# Patient Record
Sex: FEMALE | Race: BLACK OR AFRICAN AMERICAN | Employment: FULL TIME | URBAN - METROPOLITAN AREA
[De-identification: names, ages, dates, MRNs, and addresses within clinical notes are randomized per-mention and may not be internally consistent; named-entity substitution may affect disease eponyms.]

---

## 2020-10-01 ENCOUNTER — TRANSCRIBE ORDER (OUTPATIENT)
Dept: REGISTRATION | Age: 45
End: 2020-10-01

## 2020-10-01 ENCOUNTER — HOSPITAL ENCOUNTER (OUTPATIENT)
Dept: PREADMISSION TESTING | Age: 45
Discharge: HOME | End: 2020-10-01
Payer: MEDICAID

## 2020-10-01 DIAGNOSIS — Z01.812 PRE-PROCEDURE LAB EXAM: ICD-10-CM

## 2020-10-01 DIAGNOSIS — Z01.812 PRE-PROCEDURE LAB EXAM: Primary | ICD-10-CM

## 2020-10-01 PROCEDURE — 87635 SARS-COV-2 COVID-19 AMP PRB: CPT

## 2020-10-02 LAB — SARS-COV-2, COV2NT: NOT DETECTED

## 2020-10-05 ENCOUNTER — ANESTHESIA EVENT (OUTPATIENT)
Dept: SURGERY | Age: 45
End: 2020-10-05
Payer: MEDICAID

## 2020-10-05 RX ORDER — LORATADINE 10 MG
10 TABLET,DISINTEGRATING ORAL DAILY
COMMUNITY
Start: 2020-09-21

## 2020-10-05 RX ORDER — LISINOPRIL 20 MG/1
TABLET ORAL
COMMUNITY
Start: 2020-09-21

## 2020-10-05 RX ORDER — IPRATROPIUM BROMIDE AND ALBUTEROL SULFATE 2.5; .5 MG/3ML; MG/3ML
3 SOLUTION RESPIRATORY (INHALATION)
COMMUNITY
Start: 2020-09-21 | End: 2021-09-21

## 2020-10-05 RX ORDER — ROSUVASTATIN CALCIUM 10 MG/1
10 TABLET, COATED ORAL DAILY
COMMUNITY
Start: 2020-09-29 | End: 2021-03-28

## 2020-10-05 RX ORDER — FUROSEMIDE 20 MG/1
20 TABLET ORAL DAILY
COMMUNITY
Start: 2020-09-21 | End: 2021-09-21

## 2020-10-05 RX ORDER — ALBUTEROL SULFATE 90 UG/1
2 AEROSOL, METERED RESPIRATORY (INHALATION)
COMMUNITY
Start: 2020-09-21

## 2020-10-05 RX ORDER — GLYBURIDE-METFORMIN HYDROCHLORIDE 5; 500 MG/1; MG/1
1 TABLET ORAL
COMMUNITY
Start: 2020-09-21 | End: 2021-09-21

## 2020-10-06 ENCOUNTER — HOSPITAL ENCOUNTER (OUTPATIENT)
Age: 45
Setting detail: OUTPATIENT SURGERY
Discharge: HOME | End: 2020-10-06
Attending: DENTIST | Admitting: DENTIST
Payer: MEDICAID

## 2020-10-06 ENCOUNTER — ANESTHESIA (OUTPATIENT)
Dept: SURGERY | Age: 45
End: 2020-10-06
Payer: MEDICAID

## 2020-10-06 VITALS
TEMPERATURE: 97.5 F | HEART RATE: 83 BPM | RESPIRATION RATE: 20 BRPM | OXYGEN SATURATION: 92 % | BODY MASS INDEX: 41.99 KG/M2 | HEIGHT: 63 IN | WEIGHT: 237 LBS | DIASTOLIC BLOOD PRESSURE: 109 MMHG | SYSTOLIC BLOOD PRESSURE: 183 MMHG

## 2020-10-06 DIAGNOSIS — K02.9 CARIES INVOLVING MULTIPLE SURFACES OF TOOTH: Primary | ICD-10-CM

## 2020-10-06 LAB
GLUCOSE BLD STRIP.AUTO-MCNC: 185 MG/DL (ref 70–110)
GLUCOSE BLD STRIP.AUTO-MCNC: 216 MG/DL (ref 70–110)

## 2020-10-06 PROCEDURE — 2709999900 HC NON-CHARGEABLE SUPPLY: Performed by: DENTIST

## 2020-10-06 PROCEDURE — 76210000020 HC REC RM PH II FIRST 0.5 HR: Performed by: DENTIST

## 2020-10-06 PROCEDURE — 74011250636 HC RX REV CODE- 250/636

## 2020-10-06 PROCEDURE — 74011250636 HC RX REV CODE- 250/636: Performed by: ANESTHESIOLOGY

## 2020-10-06 PROCEDURE — 82962 GLUCOSE BLOOD TEST: CPT

## 2020-10-06 PROCEDURE — 77030040922 HC BLNKT HYPOTHRM STRY -A: Performed by: DENTIST

## 2020-10-06 PROCEDURE — 74011250636 HC RX REV CODE- 250/636: Performed by: NURSE ANESTHETIST, CERTIFIED REGISTERED

## 2020-10-06 PROCEDURE — 77030032490 HC SLV COMPR SCD KNE COVD -B: Performed by: DENTIST

## 2020-10-06 PROCEDURE — 00170 ANES INTRAORAL PX NOS: CPT | Performed by: ANESTHESIOLOGY

## 2020-10-06 PROCEDURE — 74011636637 HC RX REV CODE- 636/637: Performed by: NURSE ANESTHETIST, CERTIFIED REGISTERED

## 2020-10-06 PROCEDURE — 74011000250 HC RX REV CODE- 250: Performed by: DENTIST

## 2020-10-06 PROCEDURE — 77030010813: Performed by: DENTIST

## 2020-10-06 PROCEDURE — 00170 ANES INTRAORAL PX NOS: CPT | Performed by: NURSE ANESTHETIST, CERTIFIED REGISTERED

## 2020-10-06 PROCEDURE — 77030040361 HC SLV COMPR DVT MDII -B: Performed by: DENTIST

## 2020-10-06 PROCEDURE — 76210000017 HC OR PH I REC 1.5 TO 2 HR: Performed by: DENTIST

## 2020-10-06 PROCEDURE — 76060000032 HC ANESTHESIA 0.5 TO 1 HR: Performed by: DENTIST

## 2020-10-06 PROCEDURE — 76010000138 HC OR TIME 0.5 TO 1 HR: Performed by: DENTIST

## 2020-10-06 PROCEDURE — 74011000250 HC RX REV CODE- 250

## 2020-10-06 PROCEDURE — 77030031139 HC SUT VCRL2 J&J -A: Performed by: DENTIST

## 2020-10-06 PROCEDURE — 74011250637 HC RX REV CODE- 250/637: Performed by: NURSE ANESTHETIST, CERTIFIED REGISTERED

## 2020-10-06 PROCEDURE — 74011000250 HC RX REV CODE- 250: Performed by: NURSE ANESTHETIST, CERTIFIED REGISTERED

## 2020-10-06 RX ORDER — DEXTROSE 50 % IN WATER (D50W) INTRAVENOUS SYRINGE
25-50 AS NEEDED
Status: DISCONTINUED | OUTPATIENT
Start: 2020-10-06 | End: 2020-10-06 | Stop reason: HOSPADM

## 2020-10-06 RX ORDER — MAGNESIUM SULFATE 100 %
4 CRYSTALS MISCELLANEOUS AS NEEDED
Status: DISCONTINUED | OUTPATIENT
Start: 2020-10-06 | End: 2020-10-06 | Stop reason: HOSPADM

## 2020-10-06 RX ORDER — FENTANYL CITRATE 50 UG/ML
INJECTION, SOLUTION INTRAMUSCULAR; INTRAVENOUS AS NEEDED
Status: DISCONTINUED | OUTPATIENT
Start: 2020-10-06 | End: 2020-10-06 | Stop reason: HOSPADM

## 2020-10-06 RX ORDER — IPRATROPIUM BROMIDE AND ALBUTEROL SULFATE 2.5; .5 MG/3ML; MG/3ML
3 SOLUTION RESPIRATORY (INHALATION)
Status: COMPLETED | OUTPATIENT
Start: 2020-10-06 | End: 2020-10-06

## 2020-10-06 RX ORDER — LABETALOL HCL 20 MG/4 ML
5 SYRINGE (ML) INTRAVENOUS
Status: COMPLETED | OUTPATIENT
Start: 2020-10-06 | End: 2020-10-06

## 2020-10-06 RX ORDER — LIDOCAINE HYDROCHLORIDE 10 MG/ML
0.1 INJECTION, SOLUTION EPIDURAL; INFILTRATION; INTRACAUDAL; PERINEURAL AS NEEDED
Status: DISCONTINUED | OUTPATIENT
Start: 2020-10-06 | End: 2020-10-06 | Stop reason: HOSPADM

## 2020-10-06 RX ORDER — SODIUM CHLORIDE, SODIUM LACTATE, POTASSIUM CHLORIDE, CALCIUM CHLORIDE 600; 310; 30; 20 MG/100ML; MG/100ML; MG/100ML; MG/100ML
100 INJECTION, SOLUTION INTRAVENOUS CONTINUOUS
Status: DISCONTINUED | OUTPATIENT
Start: 2020-10-06 | End: 2020-10-06 | Stop reason: HOSPADM

## 2020-10-06 RX ORDER — PROPOFOL 10 MG/ML
INJECTION, EMULSION INTRAVENOUS AS NEEDED
Status: DISCONTINUED | OUTPATIENT
Start: 2020-10-06 | End: 2020-10-06 | Stop reason: HOSPADM

## 2020-10-06 RX ORDER — FAMOTIDINE 20 MG/1
20 TABLET, FILM COATED ORAL ONCE
Status: COMPLETED | OUTPATIENT
Start: 2020-10-06 | End: 2020-10-06

## 2020-10-06 RX ORDER — ONDANSETRON 2 MG/ML
INJECTION INTRAMUSCULAR; INTRAVENOUS AS NEEDED
Status: DISCONTINUED | OUTPATIENT
Start: 2020-10-06 | End: 2020-10-06 | Stop reason: HOSPADM

## 2020-10-06 RX ORDER — HYDROCODONE BITARTRATE AND ACETAMINOPHEN 5; 325 MG/1; MG/1
1 TABLET ORAL
Qty: 12 TAB | Refills: 0 | Status: SHIPPED | OUTPATIENT
Start: 2020-10-06 | End: 2020-10-09

## 2020-10-06 RX ORDER — AMOXICILLIN 500 MG/1
500 CAPSULE ORAL 3 TIMES DAILY
Qty: 21 CAP | Refills: 0 | Status: SHIPPED | OUTPATIENT
Start: 2020-10-06 | End: 2020-10-13

## 2020-10-06 RX ORDER — SODIUM CHLORIDE 0.9 % (FLUSH) 0.9 %
5-40 SYRINGE (ML) INJECTION EVERY 8 HOURS
Status: DISCONTINUED | OUTPATIENT
Start: 2020-10-06 | End: 2020-10-06 | Stop reason: HOSPADM

## 2020-10-06 RX ORDER — LABETALOL HCL 20 MG/4 ML
SYRINGE (ML) INTRAVENOUS AS NEEDED
Status: DISCONTINUED | OUTPATIENT
Start: 2020-10-06 | End: 2020-10-06 | Stop reason: HOSPADM

## 2020-10-06 RX ORDER — INSULIN LISPRO 100 [IU]/ML
INJECTION, SOLUTION INTRAVENOUS; SUBCUTANEOUS ONCE
Status: COMPLETED | OUTPATIENT
Start: 2020-10-06 | End: 2020-10-06

## 2020-10-06 RX ORDER — SODIUM CHLORIDE 0.9 % (FLUSH) 0.9 %
5-40 SYRINGE (ML) INJECTION AS NEEDED
Status: DISCONTINUED | OUTPATIENT
Start: 2020-10-06 | End: 2020-10-06 | Stop reason: HOSPADM

## 2020-10-06 RX ORDER — HYDROMORPHONE HYDROCHLORIDE 2 MG/ML
0.2 INJECTION, SOLUTION INTRAMUSCULAR; INTRAVENOUS; SUBCUTANEOUS AS NEEDED
Status: DISCONTINUED | OUTPATIENT
Start: 2020-10-06 | End: 2020-10-06 | Stop reason: HOSPADM

## 2020-10-06 RX ORDER — LABETALOL HCL 20 MG/4 ML
10 SYRINGE (ML) INTRAVENOUS
Status: COMPLETED | OUTPATIENT
Start: 2020-10-06 | End: 2020-10-06

## 2020-10-06 RX ORDER — DIPHENHYDRAMINE HYDROCHLORIDE 50 MG/ML
INJECTION, SOLUTION INTRAMUSCULAR; INTRAVENOUS AS NEEDED
Status: DISCONTINUED | OUTPATIENT
Start: 2020-10-06 | End: 2020-10-06 | Stop reason: HOSPADM

## 2020-10-06 RX ORDER — HYDROMORPHONE HYDROCHLORIDE 2 MG/ML
0.5 INJECTION, SOLUTION INTRAMUSCULAR; INTRAVENOUS; SUBCUTANEOUS
Status: DISCONTINUED | OUTPATIENT
Start: 2020-10-06 | End: 2020-10-06 | Stop reason: HOSPADM

## 2020-10-06 RX ORDER — OXYCODONE AND ACETAMINOPHEN 5; 325 MG/1; MG/1
1 TABLET ORAL AS NEEDED
Status: DISCONTINUED | OUTPATIENT
Start: 2020-10-06 | End: 2020-10-06 | Stop reason: HOSPADM

## 2020-10-06 RX ORDER — IBUPROFEN 200 MG
800 TABLET ORAL
Qty: 25 TAB | Refills: 1 | Status: SHIPPED | OUTPATIENT
Start: 2020-10-06

## 2020-10-06 RX ORDER — LIDOCAINE HYDROCHLORIDE 20 MG/ML
INJECTION, SOLUTION EPIDURAL; INFILTRATION; INTRACAUDAL; PERINEURAL AS NEEDED
Status: DISCONTINUED | OUTPATIENT
Start: 2020-10-06 | End: 2020-10-06 | Stop reason: HOSPADM

## 2020-10-06 RX ORDER — DEXAMETHASONE SODIUM PHOSPHATE 4 MG/ML
INJECTION, SOLUTION INTRA-ARTICULAR; INTRALESIONAL; INTRAMUSCULAR; INTRAVENOUS; SOFT TISSUE AS NEEDED
Status: DISCONTINUED | OUTPATIENT
Start: 2020-10-06 | End: 2020-10-06 | Stop reason: HOSPADM

## 2020-10-06 RX ORDER — INSULIN LISPRO 100 [IU]/ML
INJECTION, SOLUTION INTRAVENOUS; SUBCUTANEOUS ONCE
Status: DISCONTINUED | OUTPATIENT
Start: 2020-10-06 | End: 2020-10-06 | Stop reason: HOSPADM

## 2020-10-06 RX ORDER — SUCCINYLCHOLINE CHLORIDE 20 MG/ML
INJECTION INTRAMUSCULAR; INTRAVENOUS AS NEEDED
Status: DISCONTINUED | OUTPATIENT
Start: 2020-10-06 | End: 2020-10-06 | Stop reason: HOSPADM

## 2020-10-06 RX ORDER — DIPHENHYDRAMINE HYDROCHLORIDE 50 MG/ML
12.5 INJECTION, SOLUTION INTRAMUSCULAR; INTRAVENOUS
Status: DISCONTINUED | OUTPATIENT
Start: 2020-10-06 | End: 2020-10-06 | Stop reason: HOSPADM

## 2020-10-06 RX ORDER — SODIUM CHLORIDE, SODIUM LACTATE, POTASSIUM CHLORIDE, CALCIUM CHLORIDE 600; 310; 30; 20 MG/100ML; MG/100ML; MG/100ML; MG/100ML
25 INJECTION, SOLUTION INTRAVENOUS CONTINUOUS
Status: DISCONTINUED | OUTPATIENT
Start: 2020-10-06 | End: 2020-10-06 | Stop reason: HOSPADM

## 2020-10-06 RX ORDER — IPRATROPIUM BROMIDE AND ALBUTEROL SULFATE 2.5; .5 MG/3ML; MG/3ML
SOLUTION RESPIRATORY (INHALATION)
Status: COMPLETED
Start: 2020-10-06 | End: 2020-10-06

## 2020-10-06 RX ORDER — MIDAZOLAM HYDROCHLORIDE 1 MG/ML
INJECTION, SOLUTION INTRAMUSCULAR; INTRAVENOUS AS NEEDED
Status: DISCONTINUED | OUTPATIENT
Start: 2020-10-06 | End: 2020-10-06 | Stop reason: HOSPADM

## 2020-10-06 RX ORDER — ONDANSETRON 2 MG/ML
4 INJECTION INTRAMUSCULAR; INTRAVENOUS ONCE
Status: COMPLETED | OUTPATIENT
Start: 2020-10-06 | End: 2020-10-06

## 2020-10-06 RX ADMIN — FENTANYL CITRATE 50 MCG: 50 INJECTION, SOLUTION INTRAMUSCULAR; INTRAVENOUS at 11:20

## 2020-10-06 RX ADMIN — LABETALOL HYDROCHLORIDE 10 MG: 5 INJECTION, SOLUTION INTRAVENOUS at 13:20

## 2020-10-06 RX ADMIN — LABETALOL 20 MG/4 ML (5 MG/ML) INTRAVENOUS SYRINGE 20 MG: at 11:42

## 2020-10-06 RX ADMIN — ONDANSETRON 4 MG: 2 INJECTION INTRAMUSCULAR; INTRAVENOUS at 12:23

## 2020-10-06 RX ADMIN — PROPOFOL 190 MG: 10 INJECTION, EMULSION INTRAVENOUS at 11:20

## 2020-10-06 RX ADMIN — LABETALOL HYDROCHLORIDE 5 MG: 5 INJECTION, SOLUTION INTRAVENOUS at 12:36

## 2020-10-06 RX ADMIN — FENTANYL CITRATE 50 MCG: 50 INJECTION, SOLUTION INTRAMUSCULAR; INTRAVENOUS at 11:27

## 2020-10-06 RX ADMIN — SUCCINYLCHOLINE CHLORIDE 180 MG: 20 INJECTION, SOLUTION INTRAMUSCULAR; INTRAVENOUS at 11:20

## 2020-10-06 RX ADMIN — HYDROMORPHONE HYDROCHLORIDE 0.5 MG: 2 INJECTION, SOLUTION INTRAMUSCULAR; INTRAVENOUS; SUBCUTANEOUS at 12:22

## 2020-10-06 RX ADMIN — SODIUM CHLORIDE, SODIUM LACTATE, POTASSIUM CHLORIDE, AND CALCIUM CHLORIDE 25 ML/HR: 600; 310; 30; 20 INJECTION, SOLUTION INTRAVENOUS at 10:44

## 2020-10-06 RX ADMIN — FAMOTIDINE 20 MG: 20 TABLET ORAL at 10:44

## 2020-10-06 RX ADMIN — LIDOCAINE HYDROCHLORIDE 100 MG: 20 INJECTION, SOLUTION EPIDURAL; INFILTRATION; INTRACAUDAL; PERINEURAL at 11:20

## 2020-10-06 RX ADMIN — IPRATROPIUM BROMIDE AND ALBUTEROL SULFATE 3 ML: 2.5; .5 SOLUTION RESPIRATORY (INHALATION) at 13:16

## 2020-10-06 RX ADMIN — DIPHENHYDRAMINE HYDROCHLORIDE 12.5 MG: 50 INJECTION, SOLUTION INTRAMUSCULAR; INTRAVENOUS at 11:37

## 2020-10-06 RX ADMIN — MIDAZOLAM HYDROCHLORIDE 2 MG: 2 INJECTION, SOLUTION INTRAMUSCULAR; INTRAVENOUS at 11:15

## 2020-10-06 RX ADMIN — ONDANSETRON 4 MG: 2 INJECTION INTRAMUSCULAR; INTRAVENOUS at 11:25

## 2020-10-06 RX ADMIN — LABETALOL HYDROCHLORIDE 5 MG: 5 INJECTION, SOLUTION INTRAVENOUS at 12:50

## 2020-10-06 RX ADMIN — IPRATROPIUM BROMIDE AND ALBUTEROL SULFATE 3 ML: .5; 3 SOLUTION RESPIRATORY (INHALATION) at 13:16

## 2020-10-06 RX ADMIN — DEXAMETHASONE SODIUM PHOSPHATE 4 MG: 4 INJECTION, SOLUTION INTRAMUSCULAR; INTRAVENOUS at 11:25

## 2020-10-06 RX ADMIN — INSULIN LISPRO 3 UNITS: 100 INJECTION, SOLUTION INTRAVENOUS; SUBCUTANEOUS at 10:45

## 2020-10-06 NOTE — PERIOP NOTES
May  to  send patient to phase 2 for discharge home with latest elevated b/p. Pt to take her b/p medication when she gets home and medication for her blood sugar.

## 2020-10-06 NOTE — BRIEF OP NOTE
Brief Postoperative Note    Patient: Chio Puga  YOB: 1975  MRN: [id number]    Date of Procedure: 10/6/2020     Pre-Op Diagnosis: K01.1  M26.10    Post-Op Diagnosis: Same as preoperative diagnosis.       Procedure(s):  EXTRACT TEETH NUMBERS 1,2,4,5,8,12,14,15,16,19,20,29,32    Root removal 1 4 5 8 12 14 15 16 20 29 32  Surgical 2 12 25  hosp visit    Surgeon(s):  Goodove, Maeola Hodgkins R, DDS    Surgical Assistant: None    Anesthesia: General     Estimated Blood Loss (mL): Minimal    Complications: None    Specimens: * No specimens in log *     Implants: * No implants in log *    Drains: * No LDAs found *    Findings: severe caries   Electronically Signed by Tolu Dudley DDS on 10/6/2020 at 12:09 PM

## 2020-10-06 NOTE — DISCHARGE SUMMARY
Discharge Summary     Patient: Christiana Whitman MRN: [id number]  SSN: [id number]    YOB: 1975  Age: 40 y.o. Sex: female       Admit Date: 10/6/2020    Discharge Date: 10/6/2020      Admission Diagnoses: K01.1 M26.10    Discharge Diagnoses:      Discharge Condition: Myles Amezcua Course: sds    Consults: None    Significant Diagnostic Studies:none  Disposition: home    Discharge Medications:   Current Discharge Medication List      START taking these medications    Details   amoxicillin (AMOXIL) 500 mg capsule Take 1 Cap by mouth three (3) times daily for 7 days. Qty: 21 Cap, Refills: 0      HYDROcodone-acetaminophen (NORCO) 5-325 mg per tablet Take 1 Tab by mouth every four (4) hours as needed for Pain for up to 3 days. Max Daily Amount: 6 Tabs. Qty: 12 Tab, Refills: 0    Associated Diagnoses: Caries involving multiple surfaces of tooth      ibuprofen (MOTRIN) 200 mg tablet Take 4 Tabs by mouth every six (6) hours as needed for Pain. Qty: 25 Tab, Refills: 1         CONTINUE these medications which have NOT CHANGED    Details   albuterol (PROVENTIL HFA, VENTOLIN HFA, PROAIR HFA) 90 mcg/actuation inhaler Take 2 Puffs by inhalation every four (4) hours as needed. furosemide (LASIX) 20 mg tablet Take 20 mg by mouth daily. glyBURIDE-metFORMIN (GLUCOVANCE) 5-500 mg per tablet Take 1 Tab by mouth. albuterol-ipratropium (DUO-NEB) 2.5 mg-0.5 mg/3 ml nebu Take 3 mL by inhalation. lisinopriL (PRINIVIL, ZESTRIL) 20 mg tablet Take 1.5 tablets (30 mg) daily for blood pressure. loratadine (CLARITIN REDITABS) 10 mg dissolvable tablet Take 10 mg by mouth daily. rosuvastatin (CRESTOR) 10 mg tablet Take 10 mg by mouth daily.              Activity: Activity as tolerated and no driving for today  Diet: clears adv as tolerated  Wound Care: As directed    Follow-up Appointments   Procedures    FOLLOW UP VISIT Appointment in: One Week Call for appoint as needed     Call for appoint as needed     Standing Status:   Standing     Number of Occurrences:   1     Order Specific Question:   Appointment in     Answer:    One Week       Signed By: Cathye Olszewski, DDS     October 6, 2020

## 2020-10-06 NOTE — ANESTHESIA POSTPROCEDURE EVALUATION
Procedure(s):  EXTRACT TEETH NUMBERS 1,0,9,0,6,95,26,34,07,25,05,26,52.    general    Anesthesia Post Evaluation      Multimodal analgesia: multimodal analgesia used between 6 hours prior to anesthesia start to PACU discharge  Patient location during evaluation: bedside  Patient participation: complete - patient participated  Level of consciousness: awake  Pain management: adequate  Airway patency: patent  Anesthetic complications: no  Cardiovascular status: stable  Respiratory status: acceptable  Hydration status: acceptable  Post anesthesia nausea and vomiting:  controlled      INITIAL Post-op Vital signs:   Vitals Value Taken Time   /97 10/6/2020 12:49 PM   Temp 36.4 °C (97.5 °F) 10/6/2020 12:09 PM   Pulse 82 10/6/2020 12:52 PM   Resp 20 10/6/2020 12:52 PM   SpO2 91 % 10/6/2020 12:52 PM   Vitals shown include unvalidated device data.

## 2020-10-06 NOTE — OP NOTES
Operative Report    Patient: Jose Luis Wilder MRN: [id number]  SSN: [id number]    YOB: 1975  Age: 40 y.o. Sex: female       Date of Surgery: 10/6/2020     Preoperative Diagnosis: K01.1  M26.10     Postoperative Diagnosis: K01.1  M26.10     Surgeon(s) and Role:     Dillan Lucas DDS - Primary    Anesthesia: General     Procedure: Procedure(s):  EXTRACT TEETH NUMBERS 1,2,4,5,8,12,14,15,16,19,20,29,32     Roots 1 4 5 8 12 14 15 16 20 29 32   Surgical extraction 2 13 19  hosp visit    Follow up: The patient was instructed to follow up at the office in one week. The patient was given appropriate postoperative prescriptions with directions. Preop/Indications: The patient was seen previously on an outpatient basis. Following physical examination and review of medical history it was recommended to be done in outpatient hospital setting with intubated general anesthesia. PREOP:    H & P reviewed - no changes from consultation appointment, Consent confirmed signed and tooth numbers confirmed with patient. PATIENT STILL SYMPTOMATIC, Escort Present, Patient NPO 8 hrs, Timeout performed: confirmed patient name, treatment plan, and medical issues. Procedure in Detail:   After the patient was properly identified by Anesthesia, appropriately consented, the patient was taken back to the operating room. Via smooth IV induction, the patient was intubated atraumatically. The patient was turned over to Oral Surgery, prepped and draped in a normal sterilel fashion for intraoral surgical procedures. The mouth was thoroughly suctioned and inspected with the Yankauer suction. A throat pack was Placed. Anesthesia was informed that the throat pack was placed. A total of15  cc 2% xylocaine with 100,000 epinephrine was infiltrated for bilateral mandibular and maxillary  blocks. Attention was focused to the patients maxilla then mandible.   A distobucal incision was made with a 15 blade extending to tooth 1-5, area 8, areae 12-16 and on mandible area 19-20 and area 29-32. A full thickness subperiosteol flap was eleveated atraumatically. All nerve, arteries, and tissue were retracted atraumatically. It must be noted that full thickness flaps were performed in all locations of the teeth being extracted and/or surgeical procedure being performed (mentioned above). A 15 blade was used for sulcular incision with a distal buccal  release. Overlying bone (superior/buccal) was removed with a currete/#6 round bur areas of tooth #'s 1 2 4 5 8 12 13 14 15 16 19 20 29 32  At all times copius irrigation was used during the removal of bone and tooth sectioning  Teeth #'19 were vertically sectioned with a #6 round bur and a straight elevator. Note: During sectioning of the lower teeth the lingual bone was perserved and the bur never violated the linguall tissue. At all times care was taken to use a buccal surgical approach to expose and section the tooth. An atraumatic extraction of the teeth mentioned above  and/or roots was performed with a universal forceps following needed bone removal.   All sharp edges were smooth with a ronguer forceps. The site was irrigated with Normal Saline Irrigation. Site curretaged and irrigated. Care taken when curretage apical portion of socket  Following the extraction 3.0 vicryl sutures were place for adequate closure. NOTE:  The following procedure required bone removal with a bur and a flap (if required any sectioning will be mentioned below). Below is a description of each one performed:  Buccal Bone Removed With bur tooth # 19. Sectioning performed with a  bur #19  Buccal Bone Removed With a tooth # 1   Buccal Bone Removed With a  tooth # 4  Buccal Bone Removed With a tooth # 5. Buccal Bone Removed With a bur tooth # 8  Buccal Bone Removed With a  tooth # 12  Buccal Bone Removed With tooth #13. Buccal Bone Removed With a kan tooth # 14  . Buccal Bone Removed With a tooth #15  Buccal Bone Removed With a  tooth # 16. Buccal Bone Removed With a tooth # 20  Buccal Bone Removed With a bur tooth # 29  Buccal Bone Removed With a  tooth #32          The mouth was thoroughly suctioned with a Yankauer suction. The throat pack was removed. Anesthesia was informed that the throat pack was removed. The patient was turned over to anesthesia for an uneventful extubation and an uneventful recovery. LOCAL ANESTHESIA:    15 cc carpules 1% Lidocaine w/ 1:100k epi. PRESCRIPTIONS:    Narcotics given below are for expected acute pain associate with the above surgical procedure. 7 day dosage dose not exceed 120meq/day  Clindamycin 150 mg tabs  Over the counter pain medication  Norco 5/325. INSTRUCTIONS:    The patient was instructed to   return for observation and suture removal .  Prior to d/c, all sites were deemed hemostatic. All sites were packed with guaze. The patient and escort were given verbal and written postoperative instructions. The patient was given extra guaze. The patient was instruction to follow up at next available appointment or sonner with problems. Estimated Blood Loss:  Minimal    Tourniquet Time: * No tourniquets in log *      Implants: * No implants in log *            Specimens: * No specimens in log *        Drains: None                Complications: None    Counts: Sponge and needle counts were correct times two.     Signed By:  Harsha Soliz DDS     October 6, 2020

## 2020-10-06 NOTE — H&P
Day of Surgery Update:  Arianna Garcia was seen and examined. History and physical has been reviewed. The patient has been examined.  There have been no significant clinical changes since the completion of the originally dated History and Physical.    Signed By: Jason Munoz DDS     October 6, 2020 10:43 AM

## 2020-10-06 NOTE — ANESTHESIA PREPROCEDURE EVALUATION
Relevant Problems   No relevant active problems       Anesthetic History               Review of Systems / Medical History  Patient summary reviewed, nursing notes reviewed and pertinent labs reviewed    Pulmonary            Asthma        Neuro/Psych   Within defined limits           Cardiovascular    Hypertension              Exercise tolerance: >4 METS     GI/Hepatic/Renal  Within defined limits              Endo/Other    Diabetes    Obesity     Other Findings              Physical Exam    Airway  Mallampati: III  TM Distance: 4 - 6 cm  Neck ROM: normal range of motion   Mouth opening: Normal     Cardiovascular  Regular rate and rhythm,  S1 and S2 normal,  no murmur, click, rub, or gallop  Rhythm: regular  Rate: normal         Dental  No notable dental hx       Pulmonary  Breath sounds clear to auscultation               Abdominal  GI exam deferred       Other Findings            Anesthetic Plan    ASA: 3  Anesthesia type: general          Induction: Intravenous  Anesthetic plan and risks discussed with: Patient

## 2020-10-06 NOTE — DISCHARGE INSTRUCTIONS
[facility] [address] [facility]  Call office [phone] with any questions or visit [email or web address]    DAY OF SURGERY:  Immediately after surgery. Patients who received a general anesthetic should return home from the office immediately upon discharge, and lie down with the head elevated until all the effects of the anesthetic has disappeared. Anesthetic effects vary by individual, and you may feel drowsy for a short period of time or for several hours. You should not operate any mechanical equipment or drive a motor vehicle for at least 12 hours or longer if you feel any residual effect from the anesthetic. 1. Do not drive or use appliances or equipment that could be dangerous, suck as power tools, stoves, burners,  and garbage disposals. 2. Watch our for dizziness. Walk slowly and take your time. Sudden changes of position can also cause nausea. 3. Do not make any important decisions. You may change your mind tomorrow. 4. Do not drink any alcoholic beverages. The drugs in your body may cause your reaction to alcohol to be dangerous. 5. Diet: If you feel nauseated or sick to your stomach, drink clear liquids like 7-up, broth, apple juice, ginger ale, tea or cola or eat jello. If these liquids do not make you sick to your stomach try eating soft foods like potatoes, rice, pasta and cereal.  6. Discuss any questions you may have with your surgeon, Dr. Rodriguez [address] or Dr. Geno Fuentes, who can be reached at [phone].  7. In the event of a medical emergency, call [phone]. ORAL HYGIENE AND CARE: Do not disturb the surgical area today. Bite down gently but firmly on the gauze pack that we have initially placed over the surgical area making sure that they remain in place. Do not change them for the first hour unless the bleeding is not being controlled. This is important to allow blood clot formation on the surgical site. The gauze may be changed when necessary and/or repositioned for comfort.  DO NOT drink with a  Straw and DO NOT rinse or brush your teeth vigorously or probe the area with the tongue, any objects or your fingers. You may brush your teeth gently, carefully avoiding the surgical site. DO NOT SMOKE for at least 72 hours, since it is detrimental to the healing process. NEXT DAY: Start rinsing your mouth with a warm salt water rinse (1/2 tsp salt with 1 cup water). Continue this for several days, then rinse 3-4 times a day for the next 2 weeks. You may start normal toothbrushing the day after the surgery or after bleeding is controlled. It is imperative to keep your mouth clean, since an accumulation of food or debris may promote infection. BLEEDING: Some bleeding is normal, and blood tinged saliva may be present for 24 hours. This may be controlled by placing fresh gauze over the surgical area and biting down firmly for 30-60 seconds. STEADY BLEEDING: Bleeding should not be severe. If bleeding persists, this may be due to the gauze pads being clenched between the teeth rather than exerting pressure on the surgical site. Try repositioning the gauze. If bleeding persists or become heavy, substitute a moist tea bag (first soaked in hot water, squeezed dry and wrapped in a moist gauze) on the area for 20-30 minutes. If bleeding continues, please call our office. SWELLING OR BRUISING: Swelling is to be expected, and usually reached its maximum in 48 hours. To minimize swelling, cold packs or ice bag wrapped in towel should be applied to the face adjacent to the surgical area. This should be applied 20 minutes on then removed for 20 minutes during the first 12-24 hours after surgery. If you were prescribed medicine for the control of swelling, be sure to take it as directed. After 24 hours,it is usually best to switch from using the cold pack to applying moist heat or heading pad to the same area until swelling has receded. Bruising may also occur, but should disappear soon.  Tightness of the jaw muscles may cause difficulty in opening the mouth. This should disappear with 7 days. Keep lips moist with cream or vaseline to prevent cracking or chapping. Patient Education   Dental Surgery: What to Expect at [address] may have some pain, bleeding, or swelling afterward, depending on the procedure. You may get medicine for pain. The pain should improve steadily after the surgery. Dental surgery includes procedures such as tooth extractions, root canals, gum surgery, and dental implants. This care sheet gives you a general idea about how long it will take for you to recover. But each person recovers at a different pace. Follow the steps below to get better as quickly as possible. How can you care for yourself at home? Activity    · Allow the area to heal. Don't move quickly or lift anything heavy until you are feeling better.     · Rest when you feel tired.     · Your dentist may give you specific instructions on when you can do your normal activities again, such as driving and going back to work. Diet    · Eat soft foods, such as gelatin, pudding, or a thin soup. Gradually add solid foods to your diet as you heal. You can eat solid foods again in about a week.     · If you had a tooth pulled, don't use a straw for the first few days. Sucking on a straw can loosen the blood clot that forms at the surgery site. If this happens, it can delay healing. Medicines    · Your doctor will tell you if and when you can restart your medicines. He or she will also give you instructions about taking any new medicines.     · If you take aspirin or some other blood thinner, ask your doctor if and when to start taking it again. Make sure that you understand exactly what your doctor wants you to do.     · Be safe with medicines. Read and follow all instructions on the label. ? If the dentist gave you a prescription medicine for pain, take it as prescribed.   ? If you are not taking a prescription pain medicine, ask your dentist if you can take an over-the-counter medicine.     · If your dentist prescribed antibiotics, take them as directed. Do not stop taking them just because you feel better. You need to take the full course of antibiotics. Incision care    · While your mouth is numb, be careful not to bite your tongue or the inside of your cheek or lip.     · If you had a tooth pulled, bite gently on a gauze pad now and then. Change the pad as it becomes soaked with blood. Call your dentist or oral surgeon if you still have bleeding 24 hours after your surgery.     · If you had stitches in your gums, your dentist will tell you if and when you need to come back to have them removed.     · Starting 24 hours after your tooth was pulled, gently rinse your mouth with warm salt water several times a day to reduce swelling and relieve pain.     · Continue to brush your teeth and tongue carefully. Floss when your dentist says you can. Ice and heat    · If needed, put ice or a cold pack on your cheek for 10 to 20 minutes at a time. Try to do this every 1 to 2 hours for the next 3 days (when you are awake) or until the swelling goes down. Put a thin cloth between the ice and your skin. Other instructions    · Do not smoke for at least 24 hours after your surgery. Smoking can delay healing. Smoking also decreases the blood supply and can bring germs and contaminants to the mouth. Follow-up care is a key part of your treatment and safety. Be sure to make and go to all appointments, and call your dentist if you are having problems. It's also a good idea to know your test results and keep a list of the medicines you take. When should you call for help? Call 911 anytime you think you may need emergency care. For example, call if:    · You passed out (lost consciousness).     · You have severe trouble breathing.    Call your dentist now or seek immediate medical care if:    · You have pain that does not get better after you take pain medicine.     · You have loose stitches, or your incision comes open.     · You have new or more bleeding from the site.     · You have signs of infection, such as:  ? Increased pain, swelling, warmth, or redness. ? Red streaks leading from the area. ? Pus draining from the area. ? A fever.     · You have new or worse nausea or vomiting.     · You are too sick to your stomach to drink any fluids.     · You cannot keep down fluids. Watch closely for changes in your health, and be sure to contact your dentist if you have any problems. Where can you learn more? Go to http://www.gray.com/  Enter [id number] in the search box to learn more about \"Dental Surgery: What to Expect at Home. \"  Current as of: March 25, 2020               Content Version: 12.6  © 0122-0094 [employer]. Care instructions adapted under license by [employer] (which disclaims liability or warranty for this information). If you have questions about a medical condition or this instruction, always ask your healthcare professional. [address] any warranty or liability for your use of this information. DISCHARGE SUMMARY from Nurse    PATIENT INSTRUCTIONS:    After general anesthesia or intravenous sedation, for 24 hours or while taking prescription Narcotics:  · Limit your activities  · Do not drive and operate hazardous machinery  · Do not make important personal or business decisions  · Do  not drink alcoholic beverages  · If you have not urinated within 8 hours after discharge, please contact your surgeon on call.     Report the following to your surgeon:  · Excessive pain, swelling, redness or odor of or around the surgical area  · Temperature over 100.5  · Nausea and vomiting lasting longer than 4 hours or if unable to take medications  · Any signs of decreased circulation or nerve impairment to extremity: change in color, persistent numbness, tingling, coldness or increase pain  · Any questions    What to do at Home:  Recommended activity: Activity as tolerated and no driving for today and No driving while on analgesics. *  Please give a list of your current medications to your Primary Care Provider. *  Please update this list whenever your medications are discontinued, doses are      changed, or new medications (including over-the-counter products) are added. *  Please carry medication information at all times in case of emergency situations. These are general instructions for a healthy lifestyle:    No smoking/ No tobacco products/ Avoid exposure to second hand smoke  Surgeon General's Warning:  Quitting smoking now greatly reduces serious risk to your health. Obesity, smoking, and sedentary lifestyle greatly increases your risk for illness    A healthy diet, regular physical exercise & weight monitoring are important for maintaining a healthy lifestyle    You may be retaining fluid if you have a history of heart failure or if you experience any of the following symptoms:  Weight gain of 3 pounds or more overnight or 5 pounds in a week, increased swelling in our hands or feet or shortness of breath while lying flat in bed. Please call your doctor as soon as you notice any of these symptoms; do not wait until your next office visit. The discharge information has been reviewed with the patient.   The patient verbalized understanding.    ___________________________________________________________________________________________________________________________________

## (undated) DEVICE — MAGNETIC INSTRUMENT PAD 10" X 16"; MEDIUM; DISPOSABLE: Brand: CARDINAL HEALTH

## (undated) DEVICE — INTENDED FOR TISSUE SEPARATION, AND OTHER PROCEDURES THAT REQUIRE A SHARP SURGICAL BLADE TO PUNCTURE OR CUT.: Brand: BARD-PARKER ® CARBON RIB-BACK BLADES

## (undated) DEVICE — SYR 20ML LL STRL LF --

## (undated) DEVICE — 10FR FRAZIER SUCTION HANDLE: Brand: CARDINAL HEALTH

## (undated) DEVICE — STERILE LATEX POWDER-FREE SURGICAL GLOVESWITH NITRILE COATING: Brand: PROTEXIS

## (undated) DEVICE — GARMENT,MEDLINE,DVT,SEQUENTIAL,CALF,MD: Brand: MEDLINE

## (undated) DEVICE — SYR 10ML CTRL LR LCK NSAF LF --

## (undated) DEVICE — BLANKET WRM AD W50XL85.8IN PACU FULL BODY FORC AIR

## (undated) DEVICE — SPONGE GZ W4XL4IN COT 12 PLY TYP VII WVN C FLD DSGN

## (undated) DEVICE — KENDALL SCD EXPRESS SLEEVES, KNEE LENGTH, MEDIUM: Brand: KENDALL SCD

## (undated) DEVICE — MEDI-VAC SUCTION HANDLE REGULAR CAPACITY: Brand: CARDINAL HEALTH

## (undated) DEVICE — DEPAUL MINOR HEAD AND NECK: Brand: MEDLINE INDUSTRIES, INC.

## (undated) DEVICE — PACKING GZ W2INXL6FT WVN COT VAG RADPQ

## (undated) DEVICE — SOLUTION IV 1000ML 0.9% SOD CHL

## (undated) DEVICE — SUTURE VCRL SZ 4-0 L27IN ABSRB UD L19MM PS-2 3/8 CIR PRIM J426H

## (undated) DEVICE — CANNULA PERF L2IN BLNT TIP 2MM VES CLR RADPQ BODY FEM LUER

## (undated) DEVICE — SHEET, DRAPE, SPLIT, STERILE: Brand: MEDLINE

## (undated) DEVICE — NEEDLE HYPO 25GA L1.5IN BLU POLYPR HUB S STL REG BVL STR